# Patient Record
Sex: FEMALE | Race: WHITE | NOT HISPANIC OR LATINO | URBAN - METROPOLITAN AREA
[De-identification: names, ages, dates, MRNs, and addresses within clinical notes are randomized per-mention and may not be internally consistent; named-entity substitution may affect disease eponyms.]

---

## 2021-08-24 ENCOUNTER — OFFICE VISIT (OUTPATIENT)
Dept: OBSTETRICS AND GYNECOLOGY | Facility: CLINIC | Age: 36
End: 2021-08-24

## 2021-08-24 VITALS
WEIGHT: 128 LBS | BODY MASS INDEX: 22.68 KG/M2 | HEIGHT: 63 IN | DIASTOLIC BLOOD PRESSURE: 66 MMHG | SYSTOLIC BLOOD PRESSURE: 120 MMHG

## 2021-08-24 DIAGNOSIS — Z30.40 ENCOUNTER FOR REFILL OF PRESCRIPTION FOR CONTRACEPTION: ICD-10-CM

## 2021-08-24 DIAGNOSIS — Z01.419 WOMEN'S ANNUAL ROUTINE GYNECOLOGICAL EXAMINATION: Primary | ICD-10-CM

## 2021-08-24 PROCEDURE — 99395 PREV VISIT EST AGE 18-39: CPT | Performed by: OBSTETRICS & GYNECOLOGY

## 2021-08-24 RX ORDER — LORATADINE 10 MG/1
CAPSULE, LIQUID FILLED ORAL
COMMUNITY
End: 2022-08-25

## 2021-08-24 RX ORDER — NORGESTIMATE AND ETHINYL ESTRADIOL 0.25-0.035
1 KIT ORAL DAILY
Qty: 84 TABLET | Refills: 4 | Status: SHIPPED | OUTPATIENT
Start: 2021-08-24 | End: 2022-08-25 | Stop reason: SDUPTHER

## 2021-08-24 RX ORDER — MONTELUKAST SODIUM 10 MG/1
TABLET ORAL
COMMUNITY
Start: 2021-08-12

## 2021-08-24 NOTE — PROGRESS NOTES
GYN Annual Exam     CC - Here for annual exam.        HPI  Mary Cornelius is a 35 y.o. female, , who presents for annual well woman exam. Patient's last menstrual period was 2021..  Periods are regular every 25-35 days, lasting 5-6 days. Dysmenorrhea:mild, occurring first 1-2 days of flow.  Patient reports problems with: none.  There were no changes to her medical or surgical history since her last visit. Partner Status: Marital Status: .  She is sexually active. She has not had new partners since her last STD testing. She does not desire STD testing.     Additional OB/GYN History   Current contraception: contraceptive methods: OCP (estrogen/progesterone)  Desires to: continue contraception  Last Pap :   Last Completed Pap Smear          Ordered - PAP SMEAR (Every 3 Years) Ordered on 2020  Done - negative              History of abnormal Pap smear: yes - before coming to [facility]  Family history of uterine, colon, breast, or ovarian cancer: no  Performs monthly Self-Breast Exam: yes  Exercises Regularly:no  Feelings of Anxiety or Depression: no  Tobacco Usage?: No   OB History        2    Para   2    Term   2       0    AB   0    Living   2       SAB        TAB        Ectopic        Molar        Multiple        Live Births                    Health Maintenance   Topic Date Due   • Annual Gynecologic Pelvic and Breast Exam  Never done   • ANNUAL PHYSICAL  Never done   • COVID-19 Vaccine (1) Never done   • TDAP/TD VACCINES (1 - Tdap) Never done   • HEPATITIS C SCREENING  Never done   • INFLUENZA VACCINE  10/01/2021   • PAP SMEAR  2023   • Pneumococcal Vaccine 0-64  Aged Out       The additional following portions of the patient's history were reviewed and updated as appropriate: allergies, current medications, past family history, past medical history, past social history, past surgical history and problem list.    Review of Systems   Constitutional: Negative.   "  HENT: Negative.    Eyes: Negative.    Respiratory: Negative.    Cardiovascular: Negative.    Gastrointestinal: Negative.    Endocrine: Negative.    Genitourinary: Negative.    Musculoskeletal: Negative.    Skin: Negative.    Allergic/Immunologic: Negative.    Neurological: Negative.    Hematological: Negative.    Psychiatric/Behavioral: Negative.          I have reviewed and agree with the HPI, ROS, and historical information as entered above. Elissa Ross MD    Objective   /66   Ht 160 cm (63\")   Wt 58.1 kg (128 lb)   LMP 08/05/2021   BMI 22.67 kg/m²     Physical Exam  Vitals and nursing note reviewed. Exam conducted with a chaperone present.   Constitutional:       Appearance: She is well-developed.   HENT:      Head: Normocephalic and atraumatic.   Neck:      Thyroid: No thyroid mass or thyromegaly.   Cardiovascular:      Rate and Rhythm: Normal rate and regular rhythm.      Heart sounds: No murmur heard.     Pulmonary:      Effort: Pulmonary effort is normal. No retractions.      Breath sounds: Normal breath sounds. No wheezing, rhonchi or rales.   Chest:      Chest wall: No mass or tenderness.      Breasts:         Right: Normal. No mass, nipple discharge, skin change or tenderness.         Left: Normal. No mass, nipple discharge, skin change or tenderness.   Abdominal:      General: Bowel sounds are normal.      Palpations: Abdomen is soft. Abdomen is not rigid. There is no mass.      Tenderness: There is no abdominal tenderness. There is no guarding.      Hernia: No hernia is present. There is no hernia in the left inguinal area or right inguinal area.   Genitourinary:     General: Normal vulva.      Exam position: Lithotomy position.      Pubic Area: No rash.       Labia:         Right: No rash, tenderness or lesion.         Left: No rash, tenderness or lesion.       Urethra: No urethral pain or urethral swelling.      Vagina: Normal. No vaginal discharge or lesions.      Cervix: No " cervical motion tenderness, discharge, lesion or cervical bleeding.      Uterus: Normal. Not enlarged, not fixed and not tender.       Adnexa:         Right: No mass, tenderness or fullness.          Left: No mass, tenderness or fullness.        Rectum: No external hemorrhoid.   Musculoskeletal:      Cervical back: Normal range of motion. No muscular tenderness.   Neurological:      Mental Status: She is alert and oriented to person, place, and time.   Psychiatric:         Behavior: Behavior normal.            Assessment and Plan    Problem List Items Addressed This Visit     None      Visit Diagnoses     Women's annual routine gynecological examination    -  Primary    Relevant Orders    Pap IG, HPV-hr    Encounter for refill of prescription for contraception        Relevant Medications    norgestimate-ethinyl estradiol (Sprintec 28) 0.25-35 MG-MCG per tablet          1. GYN annual well woman exam.   2. OCP's/Vaginal Ring - Discussed side effects of nausea, BTB, headaches, breast tenderness and slight weight gain in the first three cycles.  Understands risks of blood clots, stroke, and theoretical risk of breast cancer.  Denies family history of blood clots.  3. Reviewed risks/benefits of hormonal contraception after age 35, including possible increased risk of breast cancer, heart disease, blood clots and strokes.  Patient strongly desires to stay on hormonal contraception.  4. Reviewed exercise as a preventative health measures.   5. Recommended use of Vitamin D replacement and getting adequate calcium in her diet. (1500mg)  6. Reccommended Flu Vaccine in Fall of each year.  7. RTC in 1 year or PRN with problems      Elissa Ross MD  08/24/2021

## 2021-09-02 DIAGNOSIS — Z01.419 WOMEN'S ANNUAL ROUTINE GYNECOLOGICAL EXAMINATION: ICD-10-CM

## 2022-07-02 RX ORDER — FLUCONAZOLE 150 MG/1
TABLET ORAL
Qty: 2 TABLET | Refills: 0 | Status: SHIPPED | OUTPATIENT
Start: 2022-07-02 | End: 2022-08-25

## 2022-08-25 ENCOUNTER — OFFICE VISIT (OUTPATIENT)
Dept: OBSTETRICS AND GYNECOLOGY | Facility: CLINIC | Age: 37
End: 2022-08-25

## 2022-08-25 VITALS
BODY MASS INDEX: 20.02 KG/M2 | WEIGHT: 113 LBS | SYSTOLIC BLOOD PRESSURE: 114 MMHG | DIASTOLIC BLOOD PRESSURE: 68 MMHG | HEIGHT: 63 IN

## 2022-08-25 DIAGNOSIS — Z01.419 WOMEN'S ANNUAL ROUTINE GYNECOLOGICAL EXAMINATION: Primary | ICD-10-CM

## 2022-08-25 DIAGNOSIS — Z30.40 ENCOUNTER FOR REFILL OF PRESCRIPTION FOR CONTRACEPTION: ICD-10-CM

## 2022-08-25 DIAGNOSIS — Z30.41 ENCOUNTER FOR SURVEILLANCE OF CONTRACEPTIVE PILLS: ICD-10-CM

## 2022-08-25 PROCEDURE — 99395 PREV VISIT EST AGE 18-39: CPT | Performed by: OBSTETRICS & GYNECOLOGY

## 2022-08-25 RX ORDER — NORGESTIMATE AND ETHINYL ESTRADIOL 0.25-0.035
1 KIT ORAL DAILY
Qty: 84 TABLET | Refills: 4 | Status: SHIPPED | OUTPATIENT
Start: 2022-08-25

## 2022-08-25 RX ORDER — LEVOCETIRIZINE DIHYDROCHLORIDE 5 MG/1
TABLET, FILM COATED ORAL
COMMUNITY
Start: 2022-08-01

## 2022-08-25 NOTE — PROGRESS NOTES
Gynecologic Annual Exam Note        Gynecologic Exam        Subjective     HPI  Mary Cornelius is a 36 y.o.  female who presents for annual well woman exam as a established patient. There were no changes to her medical or surgical history since her last visit.. Patient reports problems with: none. Patient's last menstrual period was 2022.. Her periods are regular every 25-35 days, lasting 5-6 days. The flow is moderate. Dysmenorrhea:moderate, occurring first 1-2 days of flow. . Partner Status: Marital Status: .  She is sexually active. She has not had new partners.. STD testing recommendations have been explained to the patient and she does not desire STD testing.    Additional OB/GYN History   Current contraception: contraceptive methods: OCP (estrogen/progesterone)  Desires to: continue contraception  Thromboembolic Disease: none      History of STD: no    Last Pap : 2021. Results: negative. HPV: negative  Last Completed Pap Smear          Ordered - PAP SMEAR (Every 3 Years) Ordered on 2021  SCANNED - PAP SMEAR    2020  Done - negative                 History of abnormal Pap smear: yes - many years ago  Gardasil status:refuses  Family history of uterine, colon, breast, or ovarian cancer: no  Performs monthly Self-Breast Exam: yes  Exercises Regularly:no  Feelings of Anxiety or Depression: no  Tobacco Usage?: No       Current Outpatient Medications:   •  norgestimate-ethinyl estradiol (Sprintec 28) 0.25-35 MG-MCG per tablet, Take 1 tablet by mouth Daily., Disp: 84 tablet, Rfl: 4  •  Cholecalciferol (VITAMIN D3 PO), Take  by mouth., Disp: , Rfl:   •  Cyanocobalamin (VITAMIN B 12 PO), Take  by mouth., Disp: , Rfl:   •  levocetirizine (XYZAL) 5 MG tablet, , Disp: , Rfl:   •  montelukast (SINGULAIR) 10 MG tablet, , Disp: , Rfl:   •  Probiotic Product (PROBIOTIC-10 PO), Take  by mouth., Disp: , Rfl:      Patient is requesting refills of Sprintec.    OB History      "   2    Para   2    Term   2       0    AB   0    Living   2       SAB        IAB        Ectopic        Molar        Multiple        Live Births                    Health Maintenance   Topic Date Due   • ANNUAL PHYSICAL  Never done   • TDAP/TD VACCINES (1 - Tdap) Never done   • HEPATITIS C SCREENING  Never done   • COVID-19 Vaccine (3 - Booster for Moderna series) 2022   • Annual Gynecologic Pelvic and Breast Exam  2022   • INFLUENZA VACCINE  10/01/2022   • PAP SMEAR  2024   • Pneumococcal Vaccine 0-64  Aged Out       Past Medical History:   Diagnosis Date   • Abnormal Pap smear of cervix    • Stomach ulcer         Past Surgical History:   Procedure Laterality Date   • WISDOM TOOTH EXTRACTION         The additional following portions of the patient's history were reviewed and updated as appropriate: allergies, current medications, past family history, past medical history, past social history, past surgical history and problem list.    Review of Systems   Constitutional: Negative.    HENT: Negative.    Eyes: Negative.    Respiratory: Negative.    Cardiovascular: Negative.    Gastrointestinal: Negative.    Endocrine: Negative.    Genitourinary: Negative.    Musculoskeletal: Negative.    Skin: Negative.    Allergic/Immunologic: Negative.    Neurological: Negative.    Hematological: Negative.    Psychiatric/Behavioral: Negative.          I have reviewed and agree with the HPI, ROS, and historical information as entered above. Elissa Ross MD        Objective   /68   Ht 160 cm (63\")   Wt 51.3 kg (113 lb)   LMP 2022   BMI 20.02 kg/m²     Physical Exam  Vitals and nursing note reviewed. Exam conducted with a chaperone present.   Constitutional:       Appearance: She is well-developed.   HENT:      Head: Normocephalic and atraumatic.   Neck:      Thyroid: No thyroid mass or thyromegaly.   Cardiovascular:      Rate and Rhythm: Normal rate and regular rhythm.      " Heart sounds: No murmur heard.  Pulmonary:      Effort: Pulmonary effort is normal. No retractions.      Breath sounds: Normal breath sounds. No wheezing, rhonchi or rales.   Chest:      Chest wall: No mass or tenderness.   Breasts:      Right: Normal. No mass, nipple discharge, skin change or tenderness.      Left: Normal. No mass, nipple discharge, skin change or tenderness.       Abdominal:      General: Bowel sounds are normal.      Palpations: Abdomen is soft. Abdomen is not rigid. There is no mass.      Tenderness: There is no abdominal tenderness. There is no guarding.      Hernia: No hernia is present. There is no hernia in the left inguinal area or right inguinal area.   Genitourinary:     General: Normal vulva.      Exam position: Lithotomy position.      Pubic Area: No rash.       Labia:         Right: No rash, tenderness or lesion.         Left: No rash, tenderness or lesion.       Urethra: No urethral pain or urethral swelling.      Vagina: Normal. No vaginal discharge or lesions.      Cervix: No cervical motion tenderness, discharge, lesion or cervical bleeding.      Uterus: Normal. Not enlarged, not fixed and not tender.       Adnexa:         Right: No mass, tenderness or fullness.          Left: No mass, tenderness or fullness.        Rectum: No external hemorrhoid.   Musculoskeletal:      Cervical back: Normal range of motion. No muscular tenderness.   Neurological:      Mental Status: She is alert and oriented to person, place, and time.   Psychiatric:         Behavior: Behavior normal.            Assessment and Plan    Problem List Items Addressed This Visit    None     Visit Diagnoses     Women's annual routine gynecological examination    -  Primary    Encounter for surveillance of contraceptive pills        Encounter for refill of prescription for contraception        Relevant Medications    norgestimate-ethinyl estradiol (Sprintec 28) 0.25-35 MG-MCG per tablet          1. GYN annual well woman  exam.   2. Reviewed pap guidelines.   3. OCP's/Vaginal Ring - Discussed side effects of nausea, BTB, headaches, breast tenderness and slight weight gain in the first three cycles.  Understands risks of blood clots, stroke, and theoretical risk of breast cancer.  Denies family history of blood clots.  4. Reviewed risks/benefits of hormonal contraception after age 35, including possible increased risk of breast cancer, heart disease, blood clots and strokes.  Patient strongly desires to stay on hormonal contraception.  5. Reviewed exercise as a preventative health measures.   6. Reccommended Flu Vaccine in Fall of each year.  7. RTC in 1 year or PRN with problems  Return in about 1 year (around 8/25/2023) for Annual physical.      Elissa Ross MD  08/25/2022

## 2022-08-30 DIAGNOSIS — Z30.40 ENCOUNTER FOR REFILL OF PRESCRIPTION FOR CONTRACEPTION: ICD-10-CM

## 2022-12-16 ENCOUNTER — TELEPHONE (OUTPATIENT)
Dept: OBSTETRICS AND GYNECOLOGY | Facility: CLINIC | Age: 37
End: 2022-12-16

## 2022-12-16 DIAGNOSIS — N89.8 VAGINAL ITCHING: Primary | ICD-10-CM

## 2022-12-16 DIAGNOSIS — N89.8 VAGINAL DISCHARGE: ICD-10-CM

## 2022-12-16 RX ORDER — FLUCONAZOLE 150 MG/1
TABLET ORAL
Qty: 2 TABLET | Refills: 0 | Status: SHIPPED | OUTPATIENT
Start: 2022-12-16

## 2022-12-16 NOTE — TELEPHONE ENCOUNTER
Caller: Mary Cornelius    Relationship: Self    Best call back number: [phone]    What is the best time to reach you: ANYTIME    What was the call regarding: PT CALLED IN AND BELIEVES SHE HAS A YEAST INFECTION, REQUESTING MEDICATION.   PT EXPERIENCING: REDNESS, SWELLING, ITCHING & BURNING. STARTED ON Monday AND HAS PROGRESSED THROUGHOUT THE WEEK.   CONFIRMED PHARMACY ON FILE.     Do you require a callback: YES

## 2023-08-28 ENCOUNTER — OFFICE VISIT (OUTPATIENT)
Dept: OBSTETRICS AND GYNECOLOGY | Facility: CLINIC | Age: 38
End: 2023-08-28
Payer: COMMERCIAL

## 2023-08-28 VITALS
SYSTOLIC BLOOD PRESSURE: 136 MMHG | DIASTOLIC BLOOD PRESSURE: 86 MMHG | WEIGHT: 126 LBS | HEIGHT: 63 IN | BODY MASS INDEX: 22.32 KG/M2

## 2023-08-28 DIAGNOSIS — Z01.419 WOMEN'S ANNUAL ROUTINE GYNECOLOGICAL EXAMINATION: Primary | ICD-10-CM

## 2023-08-28 DIAGNOSIS — R03.0 ELEVATED BP WITHOUT DIAGNOSIS OF HYPERTENSION: ICD-10-CM

## 2023-08-28 RX ORDER — PSEUDOEPHEDRINE HCL 120 MG/1
120 TABLET, FILM COATED, EXTENDED RELEASE ORAL EVERY 12 HOURS PRN
COMMUNITY

## 2023-08-28 RX ORDER — ACETAMINOPHEN AND CODEINE PHOSPHATE 120; 12 MG/5ML; MG/5ML
1 SOLUTION ORAL DAILY
Qty: 84 TABLET | Refills: 3 | Status: SHIPPED | OUTPATIENT
Start: 2023-08-28

## 2023-08-28 RX ORDER — DIPHENHYDRAMINE HCL 25 MG
25 CAPSULE ORAL EVERY 6 HOURS PRN
COMMUNITY

## 2023-08-28 RX ORDER — FLUTICASONE PROPIONATE 50 MCG
SPRAY, SUSPENSION (ML) NASAL
COMMUNITY
Start: 2023-05-04

## 2023-08-28 NOTE — PROGRESS NOTES
Gynecologic Annual Exam Note        Gynecologic Exam        Subjective     HPI  Mary Cornelius is a 37 y.o.  female who presents for annual well woman exam as a established patient. There were no changes to her medical or surgical history since her last visit. Patient reports problems with: none. Patient's last menstrual period was 2023 (exact date). Her periods occur every 28 days, lasting 6 days. The flow is light. She reports dysmenorrhea is mild, occurring first 1-2 days of flow. Partner Status: Marital Status: .  She is sexually active. She has not had new partners. STD testing recommendations have been explained to the patient and she does not desire STD testing.    Additional OB/GYN History   Current contraception: contraceptive methods: OCP (estrogen/progesterone)  Desires to: continue contraception  Thromboembolic Disease: none  Age of menarche: 14    History of STD: no    Last Pap : 21. Results: negative. HPV: negative.   Last Completed Pap Smear            PAP SMEAR (Every 3 Years) Next due on 2021  SCANNED - PAP SMEAR    2020  Done - negative                     History of abnormal Pap smear: yes - 20 years ago  Gardasil status:refuses  Family history of uterine, colon, breast, or ovarian cancer: no  Performs monthly Self-Breast Exam: yes  Exercises Regularly:no  Feelings of Anxiety or Depression: no  Tobacco Usage?: No       Current Outpatient Medications:     Cholecalciferol (VITAMIN D3 PO), Take  by mouth., Disp: , Rfl:     Cyanocobalamin (VITAMIN B 12 PO), Take  by mouth., Disp: , Rfl:     fluticasone (FLONASE) 50 MCG/ACT nasal spray, , Disp: , Rfl:     levocetirizine (XYZAL) 5 MG tablet, , Disp: , Rfl:     Probiotic Product (PROBIOTIC-10 PO), Take  by mouth., Disp: , Rfl:     diphenhydrAMINE (BENADRYL) 25 mg capsule, Take 1 capsule by mouth Every 6 (Six) Hours As Needed for Allergies., Disp: , Rfl:     norethindrone (MICRONOR) 0.35 MG tablet,  Take 1 tablet by mouth Daily., Disp: 84 tablet, Rfl: 3    pseudoephedrine (SUDAFED) 120 MG 12 hr tablet, Take 1 tablet by mouth Every 12 (Twelve) Hours As Needed for Congestion., Disp: , Rfl:      Patient is requesting refills of birth control.    OB History          2    Para   2    Term   2       0    AB   0    Living   2         SAB        IAB        Ectopic        Molar        Multiple        Live Births                    Health Maintenance   Topic Date Due    TDAP/TD VACCINES (1 - Tdap) Never done    HEPATITIS C SCREENING  Never done    ANNUAL PHYSICAL  Never done    COVID-19 Vaccine (3 - Moderna series) 2021    Annual Gynecologic Pelvic and Breast Exam  2023    INFLUENZA VACCINE  10/01/2023    PAP SMEAR  2024    Pneumococcal Vaccine 0-64  Aged Out       Past Medical History:   Diagnosis Date    Abnormal Pap smear of cervix 2009    Stomach ulcer         Past Surgical History:   Procedure Laterality Date    WISDOM TOOTH EXTRACTION         The additional following portions of the patient's history were reviewed and updated as appropriate: allergies, current medications, past family history, past medical history, past social history, past surgical history, and problem list.    Review of Systems   Respiratory: Negative.  Negative for shortness of breath.    Cardiovascular: Negative.  Negative for chest pain.   Gastrointestinal: Negative.  Negative for abdominal distention, abdominal pain and constipation.   Genitourinary:  Negative for breast discharge, breast lump, breast pain, dyspareunia, dysuria, menstrual problem, pelvic pain, pelvic pressure, urinary incontinence, vaginal bleeding, vaginal discharge and vaginal pain.   Psychiatric/Behavioral: Negative.  Negative for depressed mood. The patient is not nervous/anxious.    All other systems reviewed and are negative.      I have reviewed and agree with the HPI, ROS, and historical information as entered above. Belén Marinelli,  "APRN          Objective   /86   Ht 160 cm (63\")   Wt 57.2 kg (126 lb)   LMP 08/03/2023 (Exact Date)   BMI 22.32 kg/mý     Physical Exam  Vitals and nursing note reviewed. Exam conducted with a chaperone present.   Constitutional:       Appearance: Normal appearance. She is well-developed. She is not ill-appearing.   HENT:      Head: Normocephalic and atraumatic.   Neck:      Thyroid: No thyroid mass, thyromegaly or thyroid tenderness.   Cardiovascular:      Rate and Rhythm: Normal rate and regular rhythm.      Heart sounds: Normal heart sounds. No murmur heard.  Pulmonary:      Effort: Pulmonary effort is normal. No retractions.      Breath sounds: Normal breath sounds. No wheezing, rhonchi or rales.   Chest:      Chest wall: No mass or tenderness.   Breasts:     Breasts are symmetrical.      Right: Normal. Mass present. No nipple discharge, skin change or tenderness.      Left: Normal. Mass present. No nipple discharge, skin change or tenderness.      Comments: Bilateral fibrocystic breasts noted.   Abdominal:      Palpations: Abdomen is soft. Abdomen is not rigid. There is no mass.      Tenderness: There is no abdominal tenderness. There is no guarding or rebound.      Hernia: No hernia is present. There is no hernia in the left inguinal area or right inguinal area.   Genitourinary:     General: Normal vulva.      Labia:         Right: No rash, tenderness or lesion.         Left: No rash, tenderness or lesion.       Vagina: Normal. No vaginal discharge, erythema, tenderness, bleeding or lesions.      Cervix: No cervical motion tenderness, discharge, friability, lesion, erythema or cervical bleeding.      Uterus: Normal. Not enlarged, not fixed and not tender.       Adnexa: Right adnexa normal and left adnexa normal.        Right: No mass or tenderness.          Left: No mass or tenderness.        Rectum: No external hemorrhoid.   Musculoskeletal:      Cervical back: Normal range of motion. No muscular " tenderness.   Lymphadenopathy:      Upper Body:      Right upper body: No supraclavicular or axillary adenopathy.      Left upper body: No supraclavicular or axillary adenopathy.   Neurological:      Mental Status: She is alert and oriented to person, place, and time.   Psychiatric:         Mood and Affect: Mood normal.         Behavior: Behavior normal.          Assessment and Plan    Problem List Items Addressed This Visit          Cardiac and Vasculature    Elevated BP without diagnosis of hypertension    Overview     8/28/2023 Stopped Sprintec. Begin Micronor.          Relevant Medications    norethindrone (MICRONOR) 0.35 MG tablet     Other Visit Diagnoses       Women's annual routine gynecological examination    -  Primary    Relevant Medications    norethindrone (MICRONOR) 0.35 MG tablet            GYN annual well woman exam.   Reviewed pap guidelines. No pap today.   Initial /86, Repeat L arm 140/85, Repeat R arm 138/85. We discussed risks associated w/ elevated BP, age (37), and ANTHONY including possible increased risk of breast cancer, heart disease, blood clots and strokes. Patient states she has been taking Pseudoephedrine 12Hr to improve allergy symptoms. She last took yesterday around noon. She believes BP is elevated due to tking. She has a BP monitor at home and will begin monitoring BP. Parameters reviewed. She will f/u w/ PCP if BP stays elevated.   We discusses progesterone only contraception options. She would like to begin using a POP. Benefits, risks, and side effects reviewed. She will begin taking at the start of her next pill pack.   Fibrocystic breast changes - She reports drinking caffeine. Encouraged decreasing caffeine, supportive bra, low dose vitamin E supplementation.  Reviewed monthly self breast exams.  Instructed to call with lumps, pain, or breast discharge.    Reviewed exercise as a preventative health measures.     Plan:   Begin POP. Will stay on this contraceptive method for  at least 3-4 months, if she does not like the way it makes her feel emotionally and BP is controlled she will go back to [facility] 28.   Begin decreasing caffeine intake.   Monitor BP and f/u with PCP if BP remains elevated.   Return in about 1 year (around 8/28/2024) for Annual physical or sooner if needed.      Belén Marinelli, APRN  08/28/2023

## 2023-08-30 DIAGNOSIS — Z30.40 ENCOUNTER FOR REFILL OF PRESCRIPTION FOR CONTRACEPTION: ICD-10-CM

## 2023-11-15 ENCOUNTER — TELEPHONE (OUTPATIENT)
Dept: OBSTETRICS AND GYNECOLOGY | Facility: CLINIC | Age: 38
End: 2023-11-15
Payer: COMMERCIAL

## 2023-11-15 DIAGNOSIS — B37.31 VULVOVAGINAL CANDIDIASIS: Primary | ICD-10-CM

## 2023-11-15 RX ORDER — FLUCONAZOLE 150 MG/1
150 TABLET ORAL DAILY
Qty: 2 TABLET | Refills: 0 | Status: SHIPPED | OUTPATIENT
Start: 2023-11-15

## 2023-11-15 NOTE — TELEPHONE ENCOUNTER
KAREEM. Yeast infection symptoms have been present since last weekend and seem to be worsening. She has tried otc treatments without improvement.  Diflucan 150 mg x 2 doses sent to her pharmacy.  She will call back if no improvement after treatment.

## 2023-11-15 NOTE — TELEPHONE ENCOUNTER
Pt stated she is pretty sure she has a yeast infection  Stated it started about Wednesday last week, has tried OTCs not helping   Symptoms include redness, itching, raw skin, white chunky discharge.

## 2024-01-17 ENCOUNTER — OFFICE VISIT (OUTPATIENT)
Dept: OBSTETRICS AND GYNECOLOGY | Facility: CLINIC | Age: 39
End: 2024-01-17
Payer: COMMERCIAL

## 2024-01-17 VITALS
RESPIRATION RATE: 18 BRPM | HEIGHT: 63 IN | DIASTOLIC BLOOD PRESSURE: 86 MMHG | WEIGHT: 131 LBS | BODY MASS INDEX: 23.21 KG/M2 | SYSTOLIC BLOOD PRESSURE: 122 MMHG

## 2024-01-17 DIAGNOSIS — L29.2 VULVAR ITCHING: ICD-10-CM

## 2024-01-17 DIAGNOSIS — B37.31 YEAST VAGINITIS: Primary | ICD-10-CM

## 2024-01-17 LAB — WET PREP GENITAL: NORMAL

## 2024-01-17 RX ORDER — ACETAMINOPHEN AND CODEINE PHOSPHATE 120; 12 MG/5ML; MG/5ML
1 SOLUTION ORAL DAILY
COMMUNITY
Start: 2023-09-01

## 2024-01-17 NOTE — PROGRESS NOTES
Chief Complaint   Patient presents with    Vaginitis         Subjective   HPI  Mary Cornelius is a 38 y.o. female, , who presents for evaluation of burning, discharge, vulvar itching, and redness . The discharge is white and thick.  Her symptoms have been present for 5 day(s).  Additional she has noticed  none .    Prior to the onset of symptoms she was not on antibiotics.  She has not recently changed soaps/detergents/toilet tissue.  Prior to this visit, she has used diflucan in an attempt to improve her symptoms. She was prescribed only on capsule of diflucan and did not see any improvement.    Sexual History    She is currently sexually active. In the past year there has been NO new sexual partners. Condoms are used intermittently.  She would not like to be screened for STD's at today's exam.    Current birth control method: OCP (estrogen/progesterone).    Menstrual History:    Patient's last menstrual period was 2023 (approximate).    In the past 6 months her cycles have been irregular. Since starting her new birth control her periods have been irregular and happening she believes every other month.   Her menstrual flow is typically normal. Intermenstrual bleeding is absent.  Post-coital bleeding is absent.      Additional OB/GYN History   Last Pap :   Last Completed Pap Smear            PAP SMEAR (Every 3 Years) Next due on 2021  SCANNED - PAP SMEAR    2020  Done - negative                    OB History          2    Para   2    Term   2       0    AB   0    Living   2         SAB        IAB        Ectopic        Molar        Multiple        Live Births                    The additional following portions of the patient's history were reviewed and updated as appropriate: allergies, current medications, past family history, past medical history, past social history, past surgical history, and problem list.    Review of Systems  All other systems  "reviewed and are negative.     I have reviewed and agree with the HPI, ROS, and historical information as entered above. Sarah Rincon, APRN      Objective   /86   Resp 18   Ht 160 cm (62.99\")   Wt 59.4 kg (131 lb)   LMP 11/24/2023 (Approximate)   BMI 23.21 kg/m²     Physical Exam  Vitals and nursing note reviewed. Exam conducted with a chaperone present.   Constitutional:       General: She is not in acute distress.     Appearance: Normal appearance. She is not ill-appearing.   Pulmonary:      Effort: Pulmonary effort is normal. No respiratory distress.   Genitourinary:     Pubic Area: Rash present.      Labia:         Right: No rash, tenderness or lesion.         Left: No rash, tenderness or lesion.       Urethra: No urethral pain or urethral swelling.      Vagina: Normal. No lesions.      Cervix: No cervical motion tenderness, discharge, lesion or cervical bleeding.      Uterus: Normal. Not enlarged, not fixed and not tender.       Adnexa:         Right: No mass or tenderness.          Left: No mass or tenderness.        Rectum: No external hemorrhoid.      Comments: Chaperone Present.  Solid beefy red rash over all vulva. No lesions  Lymphadenopathy:      Lower Body: No right inguinal adenopathy. No left inguinal adenopathy.   Skin:     General: Skin is warm and dry.   Neurological:      Mental Status: She is alert and oriented to person, place, and time.   Psychiatric:         Mood and Affect: Mood normal.         Behavior: Behavior normal.         Wet mount performed? Yeast buds    Assessment & Plan     Assessment and Plan    Problem List Items Addressed This Visit    None  Visit Diagnoses       Yeast vaginitis    -  Primary    Vulvar itching        Relevant Orders    POC Wet Prep (Completed)            D/w pt yeast noted.  Terazol 7.  Call prn continued symptoms.  RTO for annual.        Sarah Rincon, APRN  01/17/2024   "

## 2024-07-31 RX ORDER — NORETHINDRONE 0.35 MG/1
1 TABLET ORAL DAILY
Qty: 28 TABLET | Refills: 0 | Status: SHIPPED | OUTPATIENT
Start: 2024-07-31

## 2024-08-28 RX ORDER — NORETHINDRONE 0.35 MG/1
1 TABLET ORAL DAILY
Qty: 28 TABLET | Refills: 0 | Status: SHIPPED | OUTPATIENT
Start: 2024-08-28 | End: 2024-08-29 | Stop reason: SDUPTHER

## 2024-08-29 ENCOUNTER — OFFICE VISIT (OUTPATIENT)
Dept: OBSTETRICS AND GYNECOLOGY | Facility: CLINIC | Age: 39
End: 2024-08-29
Payer: COMMERCIAL

## 2024-08-29 VITALS
BODY MASS INDEX: 24.91 KG/M2 | DIASTOLIC BLOOD PRESSURE: 74 MMHG | SYSTOLIC BLOOD PRESSURE: 130 MMHG | HEIGHT: 63 IN | WEIGHT: 140.6 LBS

## 2024-08-29 DIAGNOSIS — Z01.419 WOMEN'S ANNUAL ROUTINE GYNECOLOGICAL EXAMINATION: Primary | ICD-10-CM

## 2024-08-29 DIAGNOSIS — Z30.9 ENCOUNTER FOR CONTRACEPTIVE MANAGEMENT, UNSPECIFIED TYPE: ICD-10-CM

## 2024-08-29 RX ORDER — ACETAMINOPHEN AND CODEINE PHOSPHATE 120; 12 MG/5ML; MG/5ML
1 SOLUTION ORAL DAILY
Qty: 28 TABLET | Refills: 12 | Status: SHIPPED | OUTPATIENT
Start: 2024-08-29

## 2024-08-29 NOTE — PROGRESS NOTES
Gynecologic Annual Exam Note        Gynecologic Exam        Subjective     HPI  Mary Cornelius is a 38 y.o.  female who presents for annual well woman exam as a established patient. There were no changes to her medical or surgical history since her last visit.. Patient's last menstrual period was 2024 (exact date).  Her periods are every 7-8 weeks since starting POP (2023, NDorinda Marinelli switched pt to POP due to elevated BP), lasting 6-7 days.  The flow is moderate. She reports dysmenorrhea is mild occurring first 1-2 days of flow. Marital Status: .  She is sexually active. She has not had new partners.. STD testing recommendations have been explained to the patient and she does not desire STD testing.    The patient would like to discuss the following complaints today: none    Additional OB/GYN History   contraceptive methods: Oral progesterone-only contraceptive  Desires to: continue contraception  Thromboembolic Disease: none  History of migraines: no      History of STD: no    Last Pap : 2021. Results: negative. HPV: negative.   Last Completed Pap Smear       This patient has no relevant Health Maintenance data.             History of abnormal Pap smear: yes - long time ago years ago  Gardasil status:declines  Family history of uterine, colon, breast, or ovarian cancer: no  Performs monthly Self-Breast Exam: yes  Exercises Regularly:sometimes   Feelings of Anxiety or Depression: no  Tobacco Usage?: No       Current Outpatient Medications:     Cholecalciferol (VITAMIN D3 PO), Take  by mouth., Disp: , Rfl:     Cyanocobalamin (VITAMIN B 12 PO), Take  by mouth., Disp: , Rfl:     diphenhydrAMINE (BENADRYL) 25 mg capsule, Take 1 capsule by mouth Every 6 (Six) Hours As Needed for Allergies., Disp: , Rfl:     fluticasone (FLONASE) 50 MCG/ACT nasal spray, , Disp: , Rfl:     levocetirizine (XYZAL) 5 MG tablet, , Disp: , Rfl:     norethindrone (Jencycla) 0.35 MG tablet, Take 1 tablet by mouth  Daily., Disp: 28 tablet, Rfl: 12    Probiotic Product (PROBIOTIC-10 PO), Take  by mouth., Disp: , Rfl:     pseudoephedrine (SUDAFED) 120 MG 12 hr tablet, Take 1 tablet by mouth Every 12 (Twelve) Hours As Needed for Congestion., Disp: , Rfl:      Patient is requesting refills of POP.    OB History          2    Para   2    Term   2       0    AB   0    Living   2         SAB        IAB        Ectopic        Molar        Multiple        Live Births                    Health Maintenance   Topic Date Due    LIPID PANEL  Never done    TDAP/TD VACCINES (1 - Tdap) Never done    HEPATITIS C SCREENING  Never done    ANNUAL PHYSICAL  Never done    COVID-19 Vaccine (3 - 2023-24 season) 2023    PAP SMEAR  2024    Annual Gynecologic Pelvic and Breast Exam  2024    INFLUENZA VACCINE  2024    Pneumococcal Vaccine 0-64  Aged Out       Past Medical History:   Diagnosis Date    Abnormal Pap smear of cervix     Anemia (pregnant)    Gestational diabetes 2015    Hyperlipidemia     Stomach ulcer     Urinary tract infection     Varicella         Past Surgical History:   Procedure Laterality Date    WISDOM TOOTH EXTRACTION         The additional following portions of the patient's history were reviewed and updated as appropriate: allergies, current medications, past family history, past medical history, past social history, past surgical history, and problem list.    Review of Systems   Constitutional: Negative.    HENT: Negative.     Eyes: Negative.    Respiratory: Negative.     Cardiovascular: Negative.    Gastrointestinal: Negative.    Endocrine: Negative.    Genitourinary:  Positive for menstrual problem.   Musculoskeletal: Negative.    Skin: Negative.    Allergic/Immunologic: Negative.    Neurological: Negative.    Hematological: Negative.    Psychiatric/Behavioral: Negative.           I have reviewed and agree with the HPI, ROS, and historical information as entered above.  "Elissa Ross MD          Objective   /74   Ht 160 cm (63\")   Wt 63.8 kg (140 lb 9.6 oz)   LMP 08/03/2024 (Exact Date)   BMI 24.91 kg/m²     Physical Exam  Vitals and nursing note reviewed. Exam conducted with a chaperone present.   Constitutional:       Appearance: She is well-developed.   HENT:      Head: Normocephalic and atraumatic.   Neck:      Thyroid: No thyroid mass or thyromegaly.   Cardiovascular:      Rate and Rhythm: Normal rate and regular rhythm.      Heart sounds: No murmur heard.  Pulmonary:      Effort: Pulmonary effort is normal. No retractions.      Breath sounds: Normal breath sounds. No wheezing, rhonchi or rales.   Chest:      Chest wall: No mass or tenderness.   Breasts:     Right: Normal. No mass, nipple discharge, skin change or tenderness.      Left: Normal. No mass, nipple discharge, skin change or tenderness.   Abdominal:      General: Bowel sounds are normal.      Palpations: Abdomen is soft. Abdomen is not rigid. There is no mass.      Tenderness: There is no abdominal tenderness. There is no guarding.      Hernia: No hernia is present. There is no hernia in the left inguinal area or right inguinal area.   Genitourinary:     General: Normal vulva.      Exam position: Lithotomy position.      Pubic Area: No rash.       Labia:         Right: No rash, tenderness or lesion.         Left: No rash, tenderness or lesion.       Urethra: No urethral pain or urethral swelling.      Vagina: Normal. No vaginal discharge or lesions.      Cervix: No cervical motion tenderness, discharge, lesion or cervical bleeding.      Uterus: Normal. Not enlarged, not fixed and not tender.       Adnexa:         Right: No mass, tenderness or fullness.          Left: No mass, tenderness or fullness.        Rectum: No external hemorrhoid.   Musculoskeletal:      Cervical back: Normal range of motion. No muscular tenderness.   Neurological:      Mental Status: She is alert and oriented to person, " place, and time.   Psychiatric:         Behavior: Behavior normal.            Assessment and Plan    Problem List Items Addressed This Visit    None  Visit Diagnoses       Women's annual routine gynecological examination    -  Primary    Relevant Orders    LIQUID-BASED PAP SMEAR WITH HPV GENOTYPING REGARDLESS OF INTERPRETATION ([facility])    Encounter for contraceptive management, unspecified type        Relevant Medications    norethindrone (Jencycla) 0.35 MG tablet            GYN annual well woman exam.   Reviewed pap guidelines.   Recommended use of Vitamin D replacement and getting adequate calcium in her diet. (1500mg)  Reviewed monthly self breast exams.  Instructed to call with lumps, pain, or breast discharge.    Reviewed exercise as a preventative health measures.   Reccommended Flu Vaccine in Fall of each year.  RTC in 1 year or PRN with problems  Return in about 1 year (around 8/29/2025) for Annual physical.    Elissa Ross MD  08/29/2024

## 2024-09-07 LAB — REF LAB TEST METHOD: NORMAL

## 2024-09-25 DIAGNOSIS — Z30.9 ENCOUNTER FOR CONTRACEPTIVE MANAGEMENT, UNSPECIFIED TYPE: ICD-10-CM

## 2024-09-25 RX ORDER — NORETHINDRONE 0.35 MG/1
1 TABLET ORAL DAILY
Qty: 28 TABLET | Refills: 12 | Status: SHIPPED | OUTPATIENT
Start: 2024-09-25

## 2025-02-20 ENCOUNTER — TELEPHONE (OUTPATIENT)
Dept: OBSTETRICS AND GYNECOLOGY | Facility: CLINIC | Age: 40
End: 2025-02-20
Payer: COMMERCIAL

## 2025-02-20 NOTE — TELEPHONE ENCOUNTER
Provider: DR CHRISTIAN CARUSO    Caller: Mary Cornelius    Relationship to Patient: Self    Pharmacy: [facility] [id number] - [place], [place] - [address] [phone]  - [phone] FX     Phone Number: [phone]    Reason for Call: POSSIBLE YEAST INFECTION    When was the patient last seen: 8/29/24    When did it start: YESTERDAY    Where is it located: VAGINA    Characteristics of symptom/severity: ITCHING, BURNING, REDNESS THICKER DISCHARGE    Timing- Is it constant or intermittent: CONSTANT    What makes it worse: NA    What makes it better: NA    What therapies/medications have you tried: NOTHING